# Patient Record
Sex: MALE | Race: WHITE | Employment: OTHER | URBAN - METROPOLITAN AREA
[De-identification: names, ages, dates, MRNs, and addresses within clinical notes are randomized per-mention and may not be internally consistent; named-entity substitution may affect disease eponyms.]

---

## 2022-03-01 ENCOUNTER — HOSPITAL ENCOUNTER (EMERGENCY)
Age: 27
Discharge: HOME | End: 2022-03-01
Attending: EMERGENCY MEDICINE

## 2022-03-01 VITALS
SYSTOLIC BLOOD PRESSURE: 149 MMHG | BODY MASS INDEX: 25.14 KG/M2 | WEIGHT: 202.2 LBS | DIASTOLIC BLOOD PRESSURE: 97 MMHG | HEART RATE: 73 BPM | RESPIRATION RATE: 12 BRPM | OXYGEN SATURATION: 100 % | HEIGHT: 75 IN | TEMPERATURE: 99.1 F

## 2022-03-01 DIAGNOSIS — R03.0 ELEVATED BLOOD PRESSURE READING: ICD-10-CM

## 2022-03-01 DIAGNOSIS — S81.811A LACERATION OF RIGHT LOWER EXTREMITY, INITIAL ENCOUNTER: Primary | ICD-10-CM

## 2022-03-01 PROCEDURE — 6360000002 HC RX W HCPCS: Performed by: EMERGENCY MEDICINE

## 2022-03-01 PROCEDURE — 12002 RPR S/N/AX/GEN/TRNK2.6-7.5CM: CPT

## 2022-03-01 PROCEDURE — 2500000003 HC RX 250 WO HCPCS: Performed by: EMERGENCY MEDICINE

## 2022-03-01 PROCEDURE — 6370000000 HC RX 637 (ALT 250 FOR IP): Performed by: EMERGENCY MEDICINE

## 2022-03-01 PROCEDURE — 90471 IMMUNIZATION ADMIN: CPT | Performed by: EMERGENCY MEDICINE

## 2022-03-01 PROCEDURE — 90715 TDAP VACCINE 7 YRS/> IM: CPT | Performed by: EMERGENCY MEDICINE

## 2022-03-01 PROCEDURE — 99284 EMERGENCY DEPT VISIT MOD MDM: CPT

## 2022-03-01 RX ORDER — BACITRACIN, NEOMYCIN, POLYMYXIN B 400; 3.5; 5 [USP'U]/G; MG/G; [USP'U]/G
OINTMENT TOPICAL ONCE
Status: COMPLETED | OUTPATIENT
Start: 2022-03-01 | End: 2022-03-01

## 2022-03-01 RX ORDER — LIDOCAINE HYDROCHLORIDE AND EPINEPHRINE 10; 10 MG/ML; UG/ML
20 INJECTION, SOLUTION INFILTRATION; PERINEURAL ONCE
Status: COMPLETED | OUTPATIENT
Start: 2022-03-01 | End: 2022-03-01

## 2022-03-01 RX ADMIN — LIDOCAINE HYDROCHLORIDE,EPINEPHRINE BITARTRATE 20 ML: 10; .01 INJECTION, SOLUTION INFILTRATION; PERINEURAL at 10:30

## 2022-03-01 RX ADMIN — TETANUS TOXOID, REDUCED DIPHTHERIA TOXOID AND ACELLULAR PERTUSSIS VACCINE, ADSORBED 0.5 ML: 5; 2.5; 8; 8; 2.5 SUSPENSION INTRAMUSCULAR at 10:52

## 2022-03-01 RX ADMIN — BACITRACIN ZINC, NEOMYCIN, POLYMYXIN B SULFAT: 5000; 3.5; 4 OINTMENT TOPICAL at 10:51

## 2022-03-01 ASSESSMENT — PAIN SCALES - GENERAL: PAINLEVEL_OUTOF10: 0

## 2022-03-01 NOTE — ED NOTES
Pt states that he was messing with golf clubs and using a small knife and knife slipped and pt has laceration to the right thigh and bleeding is controlled and pt denies any pain and states that he is unsure of last tetnus.       Tiara Pinon RN  03/01/22 [id number]

## 2022-03-02 NOTE — ED PROVIDER NOTES
TRIAGE CHIEF COMPLAINT:   Chief Complaint   Patient presents with    Laceration     right thigh with knife         HPI: Kavya Ramos is a 27 y.o. male who presents to the Emergency Department with complaint of a laceration of his right thigh that occurred about 9 hours ago when he accidentally cut himself with a knife while trying to remove the  from a golf club. Complains of local pain. No numbness or weakness. Tetanus is up-to-date. No other injury. REVIEW OF SYSTEMS:  6 systems reviewed. Pertinent positives per HPI. Otherwise noted to be negative. Nursing notes reviewed and agree with above. Past medical/surgical history reviewed. MEDICATIONS   There are no discharge medications for this patient. ALLERGIES No Known Allergies      BP (!) 149/97   Pulse 73   Temp 99.1 °F (37.3 °C) (Oral)   Resp 12   Ht 6' 3\" (1.905 m)   Wt 202 lb 3.2 oz (91.7 kg)   SpO2 100%   BMI 25.27 kg/m²   General:  No acute distress. Non toxic appearance  Head:   Normocephalic and atraumatic  Eyes:   Conjunctiva clear, MARCELINO, EOM's intact. Sclera anicteric. ENT:   Mucous membranes moist  Neck:   Supple. No adenopathy. Lungs/Chest:  No respiratory distress  CVS:   Regular rate and rhythm  Extremities:  Full range of motion. There is a 3 cm superficial laceration of the anterolateral aspect of the right mid thigh. It is just in the subcutaneous tissue and superficial to muscle/fascia. He has normal range of motion at the hip and knee. Distal sensation, pulses and capillary refill are normal.  No evidence of infection. Skin:   No rashes or lesions to exposed skin  Neuro:  Alert and OX3. Speech clear and appropriate. No extremity weakness. Normal sensation in all extremities. No facial asymmetry. Gait normal.  Psych:   Affect normal. Mood normal        RADIOLOGY:      LAB      PROCEDURES:   Patient verbally consented to repair of the laceration. There is no evidence of infection.   It was